# Patient Record
Sex: MALE | Race: WHITE | Employment: OTHER | URBAN - METROPOLITAN AREA
[De-identification: names, ages, dates, MRNs, and addresses within clinical notes are randomized per-mention and may not be internally consistent; named-entity substitution may affect disease eponyms.]

---

## 2019-05-20 ENCOUNTER — NEW REFERRAL (OUTPATIENT)
Dept: URBAN - METROPOLITAN AREA CLINIC 33 | Facility: CLINIC | Age: 79
End: 2019-05-20

## 2019-05-20 VITALS — WEIGHT: 158 LBS | BODY MASS INDEX: 23.95 KG/M2 | HEIGHT: 68 IN

## 2019-05-20 DIAGNOSIS — H31.411: ICD-10-CM

## 2019-05-20 PROCEDURE — 99204 OFFICE O/P NEW MOD 45 MIN: CPT

## 2019-05-20 PROCEDURE — J3301* KENALOG 4UNITS

## 2019-05-20 PROCEDURE — 67515 INJECT/TREAT EYE SOCKET: CPT

## 2019-05-20 PROCEDURE — 76512 OPH US DX B-SCAN: CPT

## 2019-05-20 ASSESSMENT — TONOMETRY
OD_IOP_MMHG: 26
OS_IOP_MMHG: 10

## 2019-05-20 ASSESSMENT — VISUAL ACUITY: OS_SC: 20/30-2

## 2019-06-05 ENCOUNTER — FOLLOW UP (OUTPATIENT)
Dept: URBAN - METROPOLITAN AREA CLINIC 33 | Facility: CLINIC | Age: 79
End: 2019-06-05

## 2019-06-05 DIAGNOSIS — H31.411: ICD-10-CM

## 2019-06-05 DIAGNOSIS — H40.1113: ICD-10-CM

## 2019-06-05 DIAGNOSIS — H43.11: ICD-10-CM

## 2019-06-05 PROCEDURE — 92225 OPHTHALMOSCOPY (INITIAL): CPT

## 2019-06-05 PROCEDURE — 76512 OPH US DX B-SCAN: CPT

## 2019-06-05 PROCEDURE — 92014 COMPRE OPH EXAM EST PT 1/>: CPT

## 2019-06-05 ASSESSMENT — TONOMETRY
OS_IOP_MMHG: 11
OD_IOP_MMHG: 38
OD_IOP_MMHG: 40

## 2019-06-05 ASSESSMENT — VISUAL ACUITY
OD_SC: CF 2FT
OS_PH: 20/25+1
OS_SC: 20/40+1

## 2019-06-19 ENCOUNTER — FOLLOW UP (OUTPATIENT)
Dept: URBAN - METROPOLITAN AREA CLINIC 33 | Facility: CLINIC | Age: 79
End: 2019-06-19

## 2019-06-19 DIAGNOSIS — H31.411: ICD-10-CM

## 2019-06-19 DIAGNOSIS — H43.392: ICD-10-CM

## 2019-06-19 DIAGNOSIS — H43.11: ICD-10-CM

## 2019-06-19 DIAGNOSIS — H40.1113: ICD-10-CM

## 2019-06-19 PROCEDURE — 92225 OPHTHALMOSCOPY (INITIAL): CPT

## 2019-06-19 PROCEDURE — 92226 OPHTHALMOSCOPY (SUB): CPT

## 2019-06-19 PROCEDURE — 92014 COMPRE OPH EXAM EST PT 1/>: CPT

## 2019-06-19 ASSESSMENT — TONOMETRY
OS_IOP_MMHG: 11
OD_IOP_MMHG: 22

## 2019-06-19 ASSESSMENT — VISUAL ACUITY
OS_SC: 20/40-
OD_SC: CF 3FT

## 2019-09-18 ENCOUNTER — FOLLOW UP (OUTPATIENT)
Dept: URBAN - METROPOLITAN AREA CLINIC 33 | Facility: CLINIC | Age: 79
End: 2019-09-18

## 2019-09-18 DIAGNOSIS — H40.1113: ICD-10-CM

## 2019-09-18 DIAGNOSIS — H31.411: ICD-10-CM

## 2019-09-18 DIAGNOSIS — H43.11: ICD-10-CM

## 2019-09-18 PROCEDURE — 92014 COMPRE OPH EXAM EST PT 1/>: CPT

## 2019-09-18 PROCEDURE — 92250 FUNDUS PHOTOGRAPHY W/I&R: CPT

## 2019-09-18 ASSESSMENT — VISUAL ACUITY
OS_SC: 20/40+2
OD_SC: 20/80-1

## 2019-09-18 ASSESSMENT — TONOMETRY
OS_IOP_MMHG: 09
OD_IOP_MMHG: 06

## 2019-12-18 ENCOUNTER — FOLLOW UP (OUTPATIENT)
Dept: URBAN - METROPOLITAN AREA CLINIC 33 | Facility: CLINIC | Age: 79
End: 2019-12-18

## 2019-12-18 DIAGNOSIS — H40.1113: ICD-10-CM

## 2019-12-18 DIAGNOSIS — H43.813: ICD-10-CM

## 2019-12-18 DIAGNOSIS — H31.411: ICD-10-CM

## 2019-12-18 DIAGNOSIS — H43.11: ICD-10-CM

## 2019-12-18 PROCEDURE — 92250 FUNDUS PHOTOGRAPHY W/I&R: CPT

## 2019-12-18 PROCEDURE — 92014 COMPRE OPH EXAM EST PT 1/>: CPT

## 2019-12-18 ASSESSMENT — VISUAL ACUITY
OD_SC: 20/40-1
OS_SC: 20/25-2

## 2019-12-18 ASSESSMENT — TONOMETRY
OD_IOP_MMHG: 09
OS_IOP_MMHG: 10

## 2020-09-03 ENCOUNTER — OFFICE VISIT (OUTPATIENT)
Dept: URBAN - METROPOLITAN AREA CLINIC 68 | Facility: CLINIC | Age: 80
End: 2020-09-03

## 2020-09-03 ENCOUNTER — TELEPHONE ENCOUNTER (OUTPATIENT)
Dept: URBAN - METROPOLITAN AREA CLINIC 68 | Facility: CLINIC | Age: 80
End: 2020-09-03

## 2020-11-01 ENCOUNTER — OFFICE VISIT (OUTPATIENT)
Dept: URBAN - METROPOLITAN AREA CLINIC 68 | Facility: CLINIC | Age: 80
End: 2020-11-01

## 2021-02-01 ENCOUNTER — OFFICE VISIT (OUTPATIENT)
Dept: URBAN - METROPOLITAN AREA CLINIC 66 | Facility: CLINIC | Age: 81
End: 2021-02-01

## 2021-02-25 ENCOUNTER — OFFICE VISIT (OUTPATIENT)
Dept: URBAN - METROPOLITAN AREA SURGERY CENTER 12 | Facility: SURGERY CENTER | Age: 81
End: 2021-02-25

## 2021-03-23 ENCOUNTER — TELEPHONE ENCOUNTER (OUTPATIENT)
Dept: URBAN - METROPOLITAN AREA CLINIC 68 | Facility: CLINIC | Age: 81
End: 2021-03-23

## 2021-03-24 ENCOUNTER — OFFICE VISIT (OUTPATIENT)
Dept: URBAN - METROPOLITAN AREA SURGERY CENTER 12 | Facility: SURGERY CENTER | Age: 81
End: 2021-03-24

## 2021-03-25 ENCOUNTER — LAB OUTSIDE AN ENCOUNTER (OUTPATIENT)
Dept: URBAN - METROPOLITAN AREA CLINIC 68 | Facility: CLINIC | Age: 81
End: 2021-03-25

## 2021-03-25 LAB — 01: (no result)

## 2021-03-30 ENCOUNTER — TELEPHONE ENCOUNTER (OUTPATIENT)
Dept: URBAN - METROPOLITAN AREA CLINIC 68 | Facility: CLINIC | Age: 81
End: 2021-03-30

## 2021-10-06 NOTE — PATIENT DISCUSSION
Patient does not feel condition is improving, Recommend patient to have Yuri Klinefelter significantly loss of glands (truncated glands) Patient is perfect candidate of Lipiflow, recommend schedule at patients convinience. Patient knows if does not proceed w/ Lipiflow would further deteriorate. Schedule PRN. Continue Warm compresses, Eryhtromycin kj, OPTase tea tree oil daily, Retaine BID-TID OU. Dermatologist prescribed trulanta, helpful to skin. Do not get too close to eyes. Patient on doxycycline PRN. not taken recently.

## 2021-11-17 NOTE — PROCEDURE NOTE: CLINICAL
PROCEDURE NOTE: LipiFlow All 4 Lids. Diagnosis: Keratoconjunctivitis Sicca, Not Specified As Sjögren's. Performed Lipiflow procedure in office with minimal complications. Given a chairdose of a low dose steroid  drop. Sample given, continue BID until out of sample. León Perez

## 2022-06-04 ENCOUNTER — TELEPHONE ENCOUNTER (OUTPATIENT)
Dept: URBAN - METROPOLITAN AREA CLINIC 68 | Facility: CLINIC | Age: 82
End: 2022-06-04

## 2022-06-04 RX ORDER — SODIUM SULFATE, POTASSIUM SULFATE, MAGNESIUM SULFATE 17.5; 3.13; 1.6 G/ML; G/ML; G/ML
SOLUTION, CONCENTRATE ORAL AS DIRECTED
Qty: 1 | Refills: 0 | OUTPATIENT
Start: 2021-02-01 | End: 2021-02-02

## 2022-06-04 RX ORDER — ELECTROLYTES/DEXTROSE
MULTIVITAMIN ADULT(  ORAL  DAILY ) INACTIVE -HX ENTRY SOLUTION, ORAL ORAL DAILY
OUTPATIENT
Start: 2020-03-09

## 2022-06-04 RX ORDER — METOPROLOL TARTRATE 25 MG/1
METOPROLOL TARTRATE( 25MG ORAL  DAILY ) INACTIVE -HX ENTRY TABLET, FILM COATED ORAL DAILY
OUTPATIENT
Start: 2020-03-09

## 2022-06-04 RX ORDER — LORATADINE 10 MG
MIRALAX(  ORAL   ) INACTIVE -HX ENTRY TABLET,DISINTEGRATING ORAL
OUTPATIENT
Start: 2020-03-09

## 2022-06-04 RX ORDER — BIOTIN 5 MG
KRILL OIL( 1000MG ORAL  DAILY ) INACTIVE -HX ENTRY TABLET ORAL DAILY
OUTPATIENT
Start: 2020-03-09

## 2022-06-05 ENCOUNTER — TELEPHONE ENCOUNTER (OUTPATIENT)
Dept: URBAN - METROPOLITAN AREA CLINIC 68 | Facility: CLINIC | Age: 82
End: 2022-06-05

## 2022-06-05 RX ORDER — TRAZODONE HYDROCHLORIDE 150 MG/1
TRAZODONE HCL( 150MG ORAL  AT BEDTIME ) ACTIVE -HX ENTRY TABLET ORAL AT BEDTIME
Status: ACTIVE | COMMUNITY
Start: 2021-02-01

## 2022-06-05 RX ORDER — METOPROLOL SUCCINATE 25 MG/1
METOPROLOL SUCCINATE ER( 25MG ORAL  DAILY ) ACTIVE -HX ENTRY TABLET, EXTENDED RELEASE ORAL DAILY
Status: ACTIVE | COMMUNITY
Start: 2021-02-01

## 2022-06-05 RX ORDER — EZETIMIBE 10 MG/1
EZETIMIBE( 10MG ORAL  DAILY ) ACTIVE -HX ENTRY TABLET ORAL DAILY
Status: ACTIVE | COMMUNITY
Start: 2021-02-01

## 2022-06-05 RX ORDER — TAMSULOSIN HYDROCHLORIDE 0.4 MG/1
TAMSULOSIN HCL( 0.4MG ORAL  DAILY ) ACTIVE -HX ENTRY CAPSULE ORAL DAILY
Status: ACTIVE | COMMUNITY
Start: 2021-02-01

## 2022-06-05 RX ORDER — CLOPIDOGREL BISULFATE 75 MG
PLAVIX( 75MG ORAL  DAILY ) ACTIVE -HX ENTRY TABLET ORAL DAILY
Status: ACTIVE | COMMUNITY
Start: 2021-02-01

## 2022-06-05 RX ORDER — ROSUVASTATIN CALCIUM 40 MG/1
ROSUVASTATIN CALCIUM( 40MG ORAL  DAILY ) ACTIVE -HX ENTRY TABLET, FILM COATED ORAL DAILY
Status: ACTIVE | COMMUNITY
Start: 2021-02-01

## 2022-06-25 ENCOUNTER — TELEPHONE ENCOUNTER (OUTPATIENT)
Age: 82
End: 2022-06-25

## 2022-06-25 RX ORDER — POLYETHYLENE GLYCOL 3350 17 G
MIRALAX(  ORAL   ) INACTIVE -HX ENTRY POWDER IN PACKET (EA) ORAL
OUTPATIENT
Start: 2020-03-09

## 2022-06-25 RX ORDER — METOPROLOL TARTRATE 25 MG/1
METOPROLOL TARTRATE( 25MG ORAL  DAILY ) INACTIVE -HX ENTRY TABLET, FILM COATED ORAL DAILY
OUTPATIENT
Start: 2020-03-09

## 2022-06-25 RX ORDER — SODIUM SULFATE, POTASSIUM SULFATE, MAGNESIUM SULFATE 17.5; 3.13; 1.6 G/ML; G/ML; G/ML
SOLUTION, CONCENTRATE ORAL AS DIRECTED
Qty: 1 | Refills: 0 | OUTPATIENT
Start: 2021-02-01 | End: 2021-02-02

## 2022-06-25 RX ORDER — ELECTROLYTES/DEXTROSE
MULTIVITAMIN ADULT(  ORAL  DAILY ) INACTIVE -HX ENTRY SOLUTION, ORAL ORAL DAILY
OUTPATIENT
Start: 2020-03-09

## 2022-06-26 ENCOUNTER — TELEPHONE ENCOUNTER (OUTPATIENT)
Age: 82
End: 2022-06-26

## 2022-06-26 RX ORDER — ROSUVASTATIN CALCIUM 40 MG/1
ROSUVASTATIN CALCIUM( 40MG ORAL  DAILY ) ACTIVE -HX ENTRY TABLET, FILM COATED ORAL DAILY
Status: ACTIVE | COMMUNITY
Start: 2021-02-01

## 2022-06-26 RX ORDER — CLOPIDOGREL BISULFATE 75 MG
PLAVIX( 75MG ORAL  DAILY ) ACTIVE -HX ENTRY TABLET ORAL DAILY
Status: ACTIVE | COMMUNITY
Start: 2021-02-01

## 2022-06-26 RX ORDER — EZETIMIBE 10 MG/1
EZETIMIBE( 10MG ORAL  DAILY ) ACTIVE -HX ENTRY TABLET ORAL DAILY
Status: ACTIVE | COMMUNITY
Start: 2021-02-01

## 2022-06-26 RX ORDER — TAMSULOSIN HYDROCHLORIDE 0.4 MG/1
TAMSULOSIN HCL( 0.4MG ORAL  DAILY ) ACTIVE -HX ENTRY CAPSULE ORAL DAILY
Status: ACTIVE | COMMUNITY
Start: 2021-02-01

## 2022-06-26 RX ORDER — TRAZODONE HYDROCHLORIDE 150 MG/1
TRAZODONE HCL( 150MG ORAL  AT BEDTIME ) ACTIVE -HX ENTRY TABLET ORAL AT BEDTIME
Status: ACTIVE | COMMUNITY
Start: 2021-02-01

## 2022-06-26 RX ORDER — CALCIUM CARBONATE/VITAMIN D3 600 MG-10
CALCIUM WITH D(     ) ACTIVE -HX ENTRY TABLET ORAL
Status: ACTIVE | COMMUNITY
Start: 2021-02-01

## 2022-06-26 RX ORDER — METOPROLOL SUCCINATE 25 MG/1
METOPROLOL SUCCINATE ER( 25MG ORAL  DAILY ) ACTIVE -HX ENTRY TABLET, EXTENDED RELEASE ORAL DAILY
Status: ACTIVE | COMMUNITY
Start: 2021-02-01